# Patient Record
Sex: FEMALE | Race: WHITE | NOT HISPANIC OR LATINO | URBAN - METROPOLITAN AREA
[De-identification: names, ages, dates, MRNs, and addresses within clinical notes are randomized per-mention and may not be internally consistent; named-entity substitution may affect disease eponyms.]

---

## 2020-09-11 ENCOUNTER — COMMUNICATION - HEALTHEAST (OUTPATIENT)
Dept: TELEHEALTH | Facility: CLINIC | Age: 65
End: 2020-09-11

## 2020-09-11 ENCOUNTER — OFFICE VISIT - HEALTHEAST (OUTPATIENT)
Dept: ALLERGY | Facility: CLINIC | Age: 65
End: 2020-09-11

## 2020-09-11 DIAGNOSIS — T78.40XA ALLERGIC REACTION, INITIAL ENCOUNTER: ICD-10-CM

## 2020-09-11 DIAGNOSIS — J30.1 SEASONAL ALLERGIC RHINITIS DUE TO POLLEN: ICD-10-CM

## 2020-09-11 RX ORDER — EPINEPHRINE 0.3 MG/.3ML
0.3 INJECTION SUBCUTANEOUS PRN
Qty: 4 | Refills: 0 | Status: SHIPPED | OUTPATIENT
Start: 2020-09-11

## 2020-09-11 RX ORDER — EPINEPHRINE 0.3 MG/.3ML
0.3 INJECTION SUBCUTANEOUS
Status: SHIPPED | COMMUNITY
Start: 2020-08-10

## 2020-09-11 RX ORDER — ACETAMINOPHEN 325 MG/1
325-650 TABLET ORAL
Status: SHIPPED | COMMUNITY
Start: 2020-09-11

## 2020-09-11 RX ORDER — IBUPROFEN 200 MG
200-400 TABLET ORAL
Status: SHIPPED | COMMUNITY
Start: 2020-09-11

## 2020-09-16 ENCOUNTER — COMMUNICATION - HEALTHEAST (OUTPATIENT)
Dept: ALLERGY | Facility: CLINIC | Age: 65
End: 2020-09-16

## 2020-09-16 LAB
D FARINAE IGE QN: <0.35 KU/L
D PTERONYSS IGE QN: <0.35 KU/L
SHRIMP IGE QN: 1.14 KU/L

## 2020-09-17 ENCOUNTER — COMMUNICATION - HEALTHEAST (OUTPATIENT)
Dept: ALLERGY | Facility: CLINIC | Age: 65
End: 2020-09-17

## 2020-09-18 ENCOUNTER — COMMUNICATION - HEALTHEAST (OUTPATIENT)
Dept: ALLERGY | Facility: CLINIC | Age: 65
End: 2020-09-18

## 2020-09-18 LAB
CLAM IGE QN: <0.35 KU/L
LOBSTER IGE QN: <0.35 KU/L
SCALLOP IGE QN: <0.35 KU/L

## 2020-09-20 LAB
CRAB IGE QN: <0.1 KU(A)/L
OYSTER IGE QN: <0.1 KU(A)/L

## 2020-09-21 ENCOUNTER — COMMUNICATION - HEALTHEAST (OUTPATIENT)
Dept: ALLERGY | Facility: CLINIC | Age: 65
End: 2020-09-21

## 2021-04-06 ENCOUNTER — COMMUNICATION - HEALTHEAST (OUTPATIENT)
Dept: ALLERGY | Facility: CLINIC | Age: 66
End: 2021-04-06

## 2021-06-05 VITALS — HEART RATE: 66 BPM | WEIGHT: 177.7 LBS | OXYGEN SATURATION: 98 % | RESPIRATION RATE: 15 BRPM

## 2021-06-11 NOTE — PROGRESS NOTES
Chief complaint:  Shrimp allergy    History Of Present Illness:  This is a pleasant 65 year old woman that is here today for evaluation of allergic reaction.  She states recently, she was cooking some shrimp.  She states previously when she would eat shrimp she was felt a little tingling of her tongue or lip.  She did not think much of it and then eats shrimp.  She does eat crab, lobster, scallops without issue last time and she ingested these was about 6 months ago.  She states that she ate about 5 shrimp while she was cooking and felt then that her hands were on fire.  She states her hand swelled some.  She then had some tingling in her lips.  She called her daughter who instructed her to drive to the emergency department.  She took Benadryl prior to going.  In the emergency room she was told she had a swollen tongue.  She was not given epinephrine but she was given steroids and antihistamines and symptoms did resolve.  She states she felt that her throat was swelled up and she felt her eyes were swollen with this.  She states about a week later she was eating at a restaurant.  She had pork loin, Au gratin potatoes and sweet corn.  She states she felt of a slight swelling of her lips and some redness.  She took Benadryl again symptoms resolved.  They found out that the grill that was being used for the sweet corn had grilled shrimp a week prior.  No strength otherwise had been cross-contamination with the food.  She does have a history of environmental allergies and notes that she has a lot of congestion in her eyes.  She is wondering what she can do for this.  She does not use any nasal sprays or rinses.  She was on some eyedrops previously.  She thinks they were steroid eyedrops.  She states all the other food she ate with a treadmill, she eats regularly.  She states she prepares a meal so she does not think there was anything in there she had had previously here recently.  No medications she had taken during  this event, other than the Benadryl.    Past medical history: Bladder surgery    Social history: She works as an , has dogs, non-smoking environment, non-smoker, has central air    Family history: Daughter with allergies and asthma    Review of Systems performed as above and the remainder is negative.                          Current Outpatient Medications:      acetaminophen (TYLENOL) 325 MG tablet, Take 325-650 mg by mouth., Disp: , Rfl:      EPINEPHrine (EPIPEN/ADRENACLICK/AUVI-Q) 0.3 mg/0.3 mL injection, Inject 0.3 mg into the shoulder, thigh, or buttocks., Disp: , Rfl:      glucos sul 2KCl/msm/chond/C/Mn (GLUCOSAMINE CHONDROITIN ORAL), , Disp: , Rfl:      ibuprofen (ADVIL,MOTRIN) 200 MG tablet, Take 200-400 mg by mouth., Disp: , Rfl:      Lactobacillus acidophilus (PROBIOTIC ORAL), , Disp: , Rfl:      LYSINE ORAL, , Disp: , Rfl:      multivitamin (MULTIPLE VITAMIN ORAL), , Disp: , Rfl:     Allergies   Allergen Reactions     Shrimp Anaphylaxis     Codeine Other (See Comments)     Depressed, bloated     Erythromycin Other (See Comments)     Puffy     Gentamicin Unknown     Eye drops     Indomethacin Unknown     Quinolones Other (See Comments)     Stomach pain       Pulse 66   Resp 15   Wt 177 lb 11.2 oz (80.6 kg)   SpO2 98%     Gen: Pleasant female not in acute distress  HEENT: Eyes no erythema of the bulbar or palpebral conjunctiva, no edema.  Nose: No congestion, mucosa normal. Mouth: Throat clear, no lip or tongue edema.       Skin: No rashes or lesions  Psych: Alert and oriented times 3    Last Food Skin Allergy Test Results  Shellfish  Clam  1:20 (W/F in mm): 0/0 (09/11/20 1031)  Oyster  1:20 (W/F in mm): 0/0 (09/11/20 1031)  Shrimp  1:20 (W/F in mm): 0/0 (09/11/20 1031)  Lobster  1:20 W/F in mm): 0/0 (09/11/20 1031)  Crab  1:20 (W/F in mm): 0/0 (09/11/20 1031)  Scallops  1:20 (W/F in mm): 0/0 (09/11/20 1031)  Fish  [place]  1:20 (W/F in mm): 0/0 (09/11/20 1031)  Controls  Device Type:  SAVANATIP (09/11/20 1031)  Neg. Control: 50% Glycerine-Saline H (W/F in millimeters): 0/0 (09/11/20 1031)  Pos. Control Histamine 6 mg/ml (W/F in millimeters): 8/27 (09/11/20 1031)      Impression report and plan:  1.  Allergic reaction    Testing today was negative.  I wonder if this is more oral allergy syndrome secondary to dust mite allergy.  I will check specific IgE to shrimp as well as dust mite.  If negative, consider challenge.  Given her systemic symptoms, she is not to eat shrimp in the future.  Food allergy action plan provided.  Epinephrine devices represcribed.  Went over how to use these properly.  I will contact the patient once testing returns.    2.  Allergic rhinitis    Suggested Carl med nasal rinses and fluticasone nasal spray.    Time spent with patient, 45 minutes, greater than half spent counseling and coordination of care regarding allergies.

## 2021-06-11 NOTE — PATIENT INSTRUCTIONS - HE
Avoidance of shrimp for now    Could consider challenge    Flonase for eyes 1 spray each nostril daily--must use regularly    ?Dust mite    Okay for fish    Carl med Sinus Rinse

## 2021-07-03 NOTE — ADDENDUM NOTE
Addendum Note by Mable Orona MD at 9/11/2020  9:30 AM     Author: Mable Orona MD Service: -- Author Type: Physician    Filed: 9/17/2020  8:21 AM Encounter Date: 9/11/2020 Status: Signed    : Mable Orona MD (Physician)    Addended by: MABLE ORONA on: 9/17/2020 08:21 AM        Modules accepted: Orders

## 2021-07-31 ENCOUNTER — HEALTH MAINTENANCE LETTER (OUTPATIENT)
Age: 66
End: 2021-07-31

## 2021-09-25 ENCOUNTER — HEALTH MAINTENANCE LETTER (OUTPATIENT)
Age: 66
End: 2021-09-25

## 2022-08-21 ENCOUNTER — HEALTH MAINTENANCE LETTER (OUTPATIENT)
Age: 67
End: 2022-08-21

## 2022-12-26 ENCOUNTER — HEALTH MAINTENANCE LETTER (OUTPATIENT)
Age: 67
End: 2022-12-26

## 2023-09-17 ENCOUNTER — HEALTH MAINTENANCE LETTER (OUTPATIENT)
Age: 68
End: 2023-09-17